# Patient Record
Sex: FEMALE | Race: WHITE | Employment: OTHER | URBAN - METROPOLITAN AREA
[De-identification: names, ages, dates, MRNs, and addresses within clinical notes are randomized per-mention and may not be internally consistent; named-entity substitution may affect disease eponyms.]

---

## 2017-09-08 ENCOUNTER — OFFICE VISIT (OUTPATIENT)
Dept: ENDOCRINOLOGY | Age: 67
End: 2017-09-08

## 2017-09-08 VITALS
WEIGHT: 158.7 LBS | DIASTOLIC BLOOD PRESSURE: 55 MMHG | SYSTOLIC BLOOD PRESSURE: 113 MMHG | HEIGHT: 66 IN | BODY MASS INDEX: 25.51 KG/M2 | HEART RATE: 56 BPM

## 2017-09-08 DIAGNOSIS — E03.9 HYPOTHYROIDISM, UNSPECIFIED TYPE: Primary | ICD-10-CM

## 2017-09-08 DIAGNOSIS — Z86.39 HISTORY OF GRAVES' DISEASE: ICD-10-CM

## 2017-09-08 RX ORDER — LEVOTHYROXINE SODIUM 75 UG/1
75 TABLET ORAL
Qty: 90 TAB | Refills: 2 | Status: SHIPPED | OUTPATIENT
Start: 2017-09-08 | End: 2021-11-02 | Stop reason: ALTCHOICE

## 2017-09-08 RX ORDER — ASCORBIC ACID 500 MG
TABLET ORAL DAILY
COMMUNITY

## 2017-09-08 RX ORDER — LANOLIN ALCOHOL/MO/W.PET/CERES
1 CREAM (GRAM) TOPICAL DAILY
COMMUNITY

## 2017-09-08 RX ORDER — ERGOCALCIFEROL (VITAMIN D2) 10 MCG
1 TABLET ORAL DAILY
COMMUNITY

## 2017-09-08 RX ORDER — ROSUVASTATIN CALCIUM 10 MG/1
10 TABLET, COATED ORAL
COMMUNITY
End: 2017-12-05

## 2017-09-08 NOTE — MR AVS SNAPSHOT
Visit Information Date & Time Provider Department Dept. Phone Encounter #  
 9/8/2017 11:10 AM Jose Bhagat, [address] [place] [address] Diabetes and Endocrinology [phone] [id number] Follow-up Instructions Return in about 2 months (around 11/8/2017). Upcoming Health Maintenance Date Due Hepatitis C Screening 1950 DTaP/Tdap/Td series (1 - Tdap) 11/5/1969 BREAST CANCER SCRN MAMMOGRAM 11/5/1998 FOBT Q 1 YEAR AGE 50-75 11/5/1998 ZOSTER VACCINE AGE 60> 9/5/2008 GLAUCOMA SCREENING Q2Y 11/5/2013 OSTEOPOROSIS SCREENING (DEXA) 11/5/2013 Pneumococcal 65+ Low/Medium Risk (1 of 2 - PCV13) 11/5/2013 MEDICARE YEARLY EXAM 11/5/2013 INFLUENZA AGE 9 TO ADULT 8/1/2017 Allergies as of 9/8/2017  Review Complete On: 9/8/2017 By: Jose Bhagat MD  
  
 Severity Noted Reaction Type Reactions Toradol [Ketorolac Tromethamine] High 08/23/2016   Systemic Other (comments) SOB and CP Percocet [Oxycodone-acetaminophen]  08/23/2016    Nausea Only Tetracyclines  08/23/2016    Nausea Only Current Immunizations  Never Reviewed No immunizations on file. Not reviewed this visit You Were Diagnosed With   
  
 Codes Comments Hypothyroidism, unspecified type    -  Primary ICD-10-CM: E03.9 ICD-9-CM: 244.9 History of Graves' disease     ICD-10-CM: Z86.39 
ICD-9-CM: V12.29 Vitals BP Pulse Height(growth percentile) Weight(growth percentile) BMI OB Status 113/55 (BP 1 Location: Left arm, BP Patient Position: Sitting) (!) 56 5' 6\" (1.676 m) 158 lb 11.2 oz (72 kg) 25.61 kg/m2 Menopause Smoking Status Never Smoker Vitals History BMI and BSA Data Body Mass Index Body Surface Area  
 25.61 kg/m 2 1.83 m 2 Preferred Pharmacy Pharmacy Name Phone [id number] [phone] Your Updated Medication List  
  
   
 This list is accurate as of: 9/8/17 11:37 AM.  Always use your most recent med list.  
  
  
  
  
 ALPRAZolam 0.5 mg tablet Commonly known as:  Celia Agustin Take 0.5 mg by mouth nightly as needed for Anxiety. Indications: ANXIETY AMBIEN 5 mg tablet Generic drug:  zolpidem Take 10 mg by mouth nightly as needed for Sleep. BIOTIN PO Take  by mouth. CYMBALTA 20 mg capsule Generic drug:  DULoxetine Take 40 mg by mouth daily. Indications: ANXIETY WITH DEPRESSION  
  
 ergocalciferol (vitamin d2) 400 unit Tab Take 1 Tab by mouth daily. glucosamine-chondroitin 500-400 mg Cap Commonly known as:  [facility] Take 1 Cap by mouth daily. levothyroxine 75 mcg tablet Commonly known as:  LEVOXYL Take 1 Tab by mouth Daily (before breakfast). Indications: hypothyroidism  
  
 rosuvastatin 10 mg tablet Commonly known as:  CRESTOR Take 10 mg by mouth nightly. therapeutic multivitamin tablet Commonly known as:  [facility] Take 1 Tab by mouth daily. Indications: VITAMIN DEFICIENCY PREVENTION  
  
 VITAMIN C 500 mg tablet Generic drug:  ascorbic acid (vitamin C) Take  by mouth. Prescriptions Sent to Pharmacy Refills  
 levothyroxine (SYNTHROID) 75 mcg tablet 2 Sig: Take 1 Tab by mouth Daily (before breakfast). Indications: hypothyroidism Class: Normal  
 Pharmacy: [id number] [address] [place] [address] #: [phone] Route: Oral  
  
We Performed the Following T4, FREE [id number] CPT(R)] THYROID PEROXIDASE (TPO) AB [53709 CPT(R)] TSH 3RD GENERATION [48152 CPT(R)] Follow-up Instructions Return in about 2 months (around 11/8/2017). To-Do List   
 10/30/2017 Lab:  T4, FREE   
  
 10/30/2017 Lab:  TSH 3RD GENERATION Patient Instructions PLEASE READ THESE INSTRUCTIONS:  
 
1. Plan to take 75 mcg of levothyroxine each morning. 2. Remember to take levothyroxine with a glass of water on an empty stomach each day in the mornings, 1 hour prior to ingesting any food or other medications, including vitamins. 3. We will plan for you to return to clinic in 2 months for re-evaluation, 
 
4. I have provided you with a lab order sheet so you can have your labs repeated 2 days before your next visit. This way we can have the results available to us in time for your visit so we can make the best decisions at that time. Introducing \[facility] [place] [facility]\"" & [facility] SERVICES! Marian Saavedra introduces [employer] patient portal. Now you can access parts of your medical record, email your doctor's office, and request medication refills online. 1. In your internet browser, go to [email or web address] 2. Click on the First Time User? Click Here link in the Sign In box. You will see the New Member Sign Up page. 3. Enter your [employer] Access Code exactly as it appears below. You will not need to use this code after youve completed the sign-up process. If you do not sign up before the expiration date, you must request a new code. · [employer] Access Code: [email or web address] Expires: 12/7/2017 11:34 AM 
 
4. Enter the last four digits of your Social Security Number (xxxx) and Date of Birth (mm/dd/yyyy) as indicated and click Submit. You will be taken to the next sign-up page. 5. Create a [employer] ID. This will be your [employer] login ID and cannot be changed, so think of one that is secure and easy to remember. 6. Create a [employer] password. You can change your password at any time. 7. Enter your Password Reset Question and Answer. This can be used at a later time if you forget your password. 8. Enter your e-mail address. You will receive e-mail notification when new information is available in [address] 9. Click Sign Up. You can now view and download portions of your medical record. 10. Click the Download Summary menu link to download a portable copy of your medical information. If you have questions, please visit the Frequently Asked Questions section of the [employer] website. Remember, [employer] is NOT to be used for urgent needs. For medical emergencies, dial 911. Now available from your iPhone and Android! Please provide this summary of care documentation to your next provider. Your primary care clinician is listed as [address] [place] [address]. If you have any questions after today's visit, please call [phone].

## 2017-09-08 NOTE — PROGRESS NOTES
CONSULTATION REQUESTED BY: Bryan Aviles MD     REASON FOR CONSULT: Hypothyroidism    CHIEF COMPLAINT: Post MARIA hypothyroidism, history of graves disease. HISTORY OF PRESENT ILLNESS:   Geraldine Mei is a 74 y.o. female with a PMHx as noted below who was referred to our endocrinology clinic for evaluation of hypothyroidism. Thyroid History:  Diagnosed with graves disease 25 years ago and had MARIA ablation,   Has been on thyroid hormone replacement since. Patient denies history of radiation (other than MARIA) exposure or trauma/surgery  Family history is significant for thryoid disease in her mother, (uncertain)  Currently taking levothyroxine 100 mcg daily,   Review of outside records indicates dose was decreased to 75 mcg in march due to high thyroid levels as below. She admits to taking it in the evenings after dinner with coffee. Patient admits to palpitations, weight gain, fatigue, and hair problems. Review of most recent thyroid function:  Outside labs dated 3/23/17:   TSH 0.118  FT4 1.96  Lab report has handwriting on it indicating need to decrease to 75 mcg. Overall, the patient would like evaluation to optimize her thyroid condition. PAST MEDICAL/SURGICAL HISTORY:   Past Medical History:   Diagnosis Date    GERD (gastroesophageal reflux disease)     not diagnosed, but pt takes lots of rolaids and tums     Thyroid disease     hypothyroidism     Past Surgical History:   Procedure Laterality Date    HX APPENDECTOMY      HX CHOLECYSTECTOMY      HX GYN      vaginal delivery x2    HX ORTHOPAEDIC Right     ORIF c hardware       ALLERGIES:   Allergies   Allergen Reactions    Toradol [Ketorolac Tromethamine] Other (comments)     SOB and CP    Percocet [Oxycodone-Acetaminophen] Nausea Only    Tetracyclines Nausea Only       MEDICATIONS ON ADMISSION:     Current Outpatient Prescriptions:     ergocalciferol, vitamin d2, 400 unit tab, Take 1 Tab by mouth daily. , Disp: , Rfl:    rosuvastatin (CRESTOR) 10 mg tablet, Take 10 mg by mouth nightly., Disp: , Rfl:     glucosamine-chondroitin (ARTHX) 500-400 mg cap, Take 1 Cap by mouth daily. , Disp: , Rfl:     ascorbic acid, vitamin C, (VITAMIN C) 500 mg tablet, Take  by mouth., Disp: , Rfl:     BIOTIN PO, Take  by mouth., Disp: , Rfl:     DULoxetine (CYMBALTA) 20 mg capsule, Take 40 mg by mouth daily. Indications: ANXIETY WITH DEPRESSION, Disp: , Rfl:     levothyroxine (LEVOXYL) 100 mcg tablet, Take 100 mcg by mouth Daily (before breakfast). Indications: HYPOTHYROIDISM, Disp: , Rfl:     ALPRAZolam (XANAX) 0.5 mg tablet, Take 0.5 mg by mouth nightly as needed for Anxiety. Indications: ANXIETY, Disp: , Rfl:     zolpidem (AMBIEN) 5 mg tablet, Take 10 mg by mouth nightly as needed for Sleep., Disp: , Rfl:     therapeutic multivitamin (THERAGRAN) tablet, Take 1 Tab by mouth daily. Indications: VITAMIN DEFICIENCY PREVENTION, Disp: , Rfl:     SOCIAL HISTORY:   Social History     Social History    Marital status:      Spouse name: N/A    Number of children: N/A    Years of education: N/A     Occupational History    Not on file.      Social History Main Topics    Smoking status: Never Smoker    Smokeless tobacco: Not on file    Alcohol use No    Drug use: Not on file    Sexual activity: Not on file     Other Topics Concern    Not on file     Social History Narrative       FAMILY HISTORY:  Family History   Problem Relation Age of Onset    Arthritis-osteo Mother     Elevated Lipids Mother    Luna May Bladder Disease Mother     Hypertension Mother     Stroke Mother     Alcohol abuse Father     Arthritis-osteo Father     Hypertension Father     Hypertension Brother     Asthma Neg Hx     Bleeding Prob Neg Hx     Cancer Neg Hx     Diabetes Neg Hx     Headache Neg Hx     Heart Disease Neg Hx     Lung Disease Neg Hx     Mental Retardation Neg Hx     Migraines Neg Hx     Psychiatric Disorder Neg Hx        REVIEW OF SYSTEMS: Complete ROS assessed and noted for that which is described above, all else are negative. Eyes: normal  ENT: normal  CVS: normal  Resp: normal  GI: normal  : normal  GYN: normal  Endocrine: normal  Integument: normal  Musculoskeletal: normal  Neuro: normal  Psych: normal      PHYSICAL EXAMINATION:    VITAL SIGNS:  Visit Vitals    /55 (BP 1 Location: Left arm, BP Patient Position: Sitting)    Pulse (!) 56    Ht 5' 6\" (1.676 m)    Wt 158 lb 11.2 oz (72 kg)    BMI 25.61 kg/m2       GENERAL: NCAT, Sitting comfortably, NAD  EYES: EOMI, non-icteric, no proptosis  Ear/Nose/Throat: NCAT, no inflammation, no masses, thyroid gland is not appreciably enlarged  LYMPH NODES: No LAD  CARDIOVASCULAR: S1 S2, RRR, No murmur, 2+ radial pulses  RESPIRATORY: CTA b/l, no wheeze/rales  GASTROINTESTINAL: NT, ND  MUSCULOSKELETAL: Normal ROM, no atrophy  SKIN: warm, no edema/rash/ or other skin changes  NEUROLOGIC: 5/5 power all extremities, no tremor, AAOx3  PSYCHIATRIC: Normal affect, Normal insight and judgement       REVIEW OF LABORATORY AND RADIOLOGY DATA:   Labs and documentation have been reviewed as described above. ASSESSMENT AND PLAN:   Trenton Garcia is a 74 y.o. female with a PMHx as noted above who was referred to our endocrinology clinic for evaluation of hypothyroidism. Hypothyroidism  History of graves disease    Patients thyroid function has fluctuated over the years due to taking levothyroxine in the evenings. She reports she was never advised of the proper way to take it. We spent time discussing the best way to have consistent levels based on proper administration. Because she is having hyperthyroid symptoms, and never decreased her dose as recommended on prior outside lab slip, likely she still has hyperthyroidism (iatrogenic) and not related to the graves disease. Likely some of her symptoms are also related to the variable absorption of her thyroid hormone.      Today we will check a TSH & FT4. Decrease to 75mcg of levothyroxine daily. Patient advised to take levothyroxine with a glass of water on an empty stomach each day in the mornings, 1 hour prior to ingesting any food or other medications, including vitamins. Discussed that if she misses a dose one day, to take two the following day, then return to once daily therafter    Plan to RTC in 2 months with calude Hdez.  [facility]

## 2017-09-08 NOTE — PATIENT INSTRUCTIONS
PLEASE READ THESE INSTRUCTIONS:     1. Plan to take 75 mcg of levothyroxine each morning. 2. Remember to take levothyroxine with a glass of water on an empty stomach each day in the mornings, 1 hour prior to ingesting any food or other medications, including vitamins. 3. We will plan for you to return to clinic in 2 months for re-evaluation,    4. I have provided you with a lab order sheet so you can have your labs repeated 2 days before your next visit. This way we can have the results available to us in time for your visit so we can make the best decisions at that time.

## 2017-09-09 LAB
T4 FREE SERPL-MCNC: 1.93 NG/DL (ref 0.82–1.77)
THYROPEROXIDASE AB SERPL-ACNC: 21 IU/ML (ref 0–34)
TSH SERPL DL<=0.005 MIU/L-ACNC: 0.05 UIU/ML (ref 0.45–4.5)

## 2017-09-11 NOTE — PROGRESS NOTES
FT4 high at 1.93 as suspected  TSH low at 0.046 also as suspected  TPO negative  We reduced levothyroxine to 75 mcg daily as of last visit,    Will reassess with prelabs before next visit,  I called patient and advised her of result and plan,    Quin Camarillo.  [address] [employer] [facility]  [facility] [place]

## 2017-12-05 NOTE — PERIOP NOTES
[facility]  Ambulatory Surgery Unit  Pre-operative Instructions    Procedure Date  12/6/17            Tentative Arrival Time 0630      1. On the day of your procedure, please report to the Ambulatory Surgery Unit Registration Desk and sign in at your designated time. The Ambulatory Surgery Unit is located in [facility] on the [facility] side of the [facility] across from the [address] [place] [address]. Please have all of your health insurance cards and a photo ID. 2. You must have someone with you to drive you home as directed by your surgeon. 3. You may have a light breakfast and take normal morning medications. 4. We recommend you do not drink any alcoholic beverages for 24 hours before and after your procedure. 5. Contact your surgeons office for instructions on the following medications: non-steroidal anti-inflammatory drugs (i.e. Advil, Aleve), vitamins, and supplements. (Some surgeons will want you to stop these medications prior to surgery and others may allow you to take them)   **If you are currently taking Plavix, Coumadin, Aspirin and/or other blood-thinning agents, contact your surgeon for instructions. ** Your surgeon will partner with the physician prescribing these medications to determine if it is safe to stop or if you need to continue taking. Please do not stop taking these medications without instructions from your surgeon. 6. In an effort to help prevent surgical site infection, we ask that you shower with an anti-bacterial soap (i.e. Dial or Safeguard) on the morning of your procedure. Do not apply any lotions, powders, or deodorants after showering. 7. Wear comfortable clothes. Wear glasses instead of contacts. Do not bring any jewelry or money (other than copays or fees as instructed). Do not wear make-up, particularly mascara, the morning of your procedure. Wear your hair loose or down, no ponytails, buns, steve pins or clips.  All body piercings must be removed. 8. You should understand that if you do not follow these instructions your procedure may be cancelled. If your physical condition changes (i.e. fever, cold or flu) please contact your surgeon as soon as possible. 9. It is important that you be on time. If a situation occurs where you may be late, or if you have any questions or problems, please call [phone].    10. Your procedure time may be subject to change. You will receive a phone call the day prior to confirm your arrival time. I understand a pre-operative phone call will be made to verify my procedure time. In the event that I am not available, I give permission for a message to be left on my answering service and/or with another person?       Yes     (instructions given verbally during phone assessment- pt voiced understanding)     ___________________      ___________________      ___________________  (Signature of Patient)          (Witness)                   (Date and Time)

## 2017-12-06 ENCOUNTER — HOSPITAL ENCOUNTER (OUTPATIENT)
Age: 67
Setting detail: OUTPATIENT SURGERY
Discharge: HOME | End: 2017-12-06
Attending: OPHTHALMOLOGY | Admitting: OPHTHALMOLOGY
Payer: MEDICARE

## 2017-12-06 VITALS
HEART RATE: 68 BPM | WEIGHT: 161 LBS | RESPIRATION RATE: 18 BRPM | SYSTOLIC BLOOD PRESSURE: 110 MMHG | TEMPERATURE: 97.7 F | DIASTOLIC BLOOD PRESSURE: 66 MMHG | OXYGEN SATURATION: 98 % | BODY MASS INDEX: 25.88 KG/M2 | HEIGHT: 66 IN

## 2017-12-06 PROBLEM — H26.492 POSTERIOR CAPSULAR OPACIFICATION VISUALLY SIGNIFICANT OF LEFT EYE: Status: ACTIVE | Noted: 2017-12-06

## 2017-12-06 PROCEDURE — 76030000017 HC AMB SURG FIRST 0.5 HR INTENSV-TIER 1: Performed by: OPHTHALMOLOGY

## 2017-12-06 PROCEDURE — 74011000250 HC RX REV CODE- 250: Performed by: OPHTHALMOLOGY

## 2017-12-06 PROCEDURE — 74011000250 HC RX REV CODE- 250

## 2017-12-06 RX ORDER — TROPICAMIDE 10 MG/ML
1 SOLUTION/ DROPS OPHTHALMIC
Status: DISCONTINUED | OUTPATIENT
Start: 2017-12-06 | End: 2017-12-06 | Stop reason: HOSPADM

## 2017-12-06 RX ORDER — PHENYLEPHRINE HYDROCHLORIDE 25 MG/ML
1 SOLUTION/ DROPS OPHTHALMIC
Status: DISCONTINUED | OUTPATIENT
Start: 2017-12-06 | End: 2017-12-06 | Stop reason: HOSPADM

## 2017-12-06 RX ORDER — TROPICAMIDE 10 MG/ML
SOLUTION/ DROPS OPHTHALMIC
Status: COMPLETED
Start: 2017-12-06 | End: 2017-12-06

## 2017-12-06 RX ORDER — PROPARACAINE HYDROCHLORIDE 5 MG/ML
1 SOLUTION/ DROPS OPHTHALMIC
Status: DISCONTINUED | OUTPATIENT
Start: 2017-12-06 | End: 2017-12-06 | Stop reason: HOSPADM

## 2017-12-06 RX ADMIN — BALANCED SALT SOLUTION 20 DROP: 6.4; .75; .48; .3; 3.9; 1.7 SOLUTION OPHTHALMIC at 07:51

## 2017-12-06 RX ADMIN — FLUOROMETHOLONE 1 DROP: 2.5 SUSPENSION/ DROPS OPHTHALMIC at 07:51

## 2017-12-06 RX ADMIN — PROPARACAINE HYDROCHLORIDE 1 DROP: 5 SOLUTION/ DROPS OPHTHALMIC at 07:23

## 2017-12-06 RX ADMIN — TROPICAMIDE 1 DROP: 10 SOLUTION/ DROPS OPHTHALMIC at 07:23

## 2017-12-06 RX ADMIN — PROPARACAINE HYDROCHLORIDE 1 DROP: 5 SOLUTION/ DROPS OPHTHALMIC at 07:31

## 2017-12-06 RX ADMIN — PROPARACAINE HYDROCHLORIDE 1 DROP: 5 SOLUTION/ DROPS OPHTHALMIC at 07:26

## 2017-12-06 RX ADMIN — HYPROMELLOSE 2910 (4000 MPA.S) 1 DROP: 25 SOLUTION/ DROPS OPHTHALMIC at 07:49

## 2017-12-06 RX ADMIN — TROPICAMIDE 1 DROP: 10 SOLUTION/ DROPS OPHTHALMIC at 07:26

## 2017-12-06 RX ADMIN — PHENYLEPHRINE HYDROCHLORIDE 1 DROP: 25 SOLUTION/ DROPS OPHTHALMIC at 07:30

## 2017-12-06 RX ADMIN — PHENYLEPHRINE HYDROCHLORIDE 1 DROP: 25 SOLUTION/ DROPS OPHTHALMIC at 07:25

## 2017-12-06 RX ADMIN — TROPICAMIDE 1 DROP: 10 SOLUTION/ DROPS OPHTHALMIC at 07:31

## 2017-12-06 RX ADMIN — PHENYLEPHRINE HYDROCHLORIDE 1 DROP: 25 SOLUTION/ DROPS OPHTHALMIC at 07:23

## 2017-12-06 NOTE — OP NOTES
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 ANESTHESIA: Topical.    LASER PARAMETERS:  Power:  2.1 millijoules per shot. Total shots delivered:  18    Estimated blood loss:None  Complications:None  Specimen removed: None    DESCRIPTION OF PROCEDURE: The patient was brought to the holding area where she received several instillations of Mydriacyl 1%, Zeferino-Synephrine 2.5%, and tetracaine until adequate pupillary dilatation was achieved. The patient's vital signs were recorded. The patient was then brought to the laser suite and received 1 drop of tetracaine preoperatively. The patient was then seated at the laser and the Schuyler capsulotomy lens was placed on the eye. The patient's posterior capsular opacification was then cleared utilizing the laser. Following this the eye was rinsed with BSS solution to remove the Goniosol solution from the surface of the eye, and the patient received 1 drop of fluorometholone drops in the operated eye. Iopidine was used at the discretion of the surgeon depending on the amount of energy necessary to clear the opacified capsule. Instructions were given to the patient verbally and written to use her FML drops 3 times a day at 9 a.m., 3 p.m., and 9 p.m. for the next 3 days. The patient will be following up with us postoperatively in the office.     [address] [place] [address]  12/6/2017

## 2018-11-02 ENCOUNTER — OFFICE VISIT (OUTPATIENT)
Dept: ENDOCRINOLOGY | Age: 68
End: 2018-11-02

## 2018-11-02 VITALS
HEIGHT: 66 IN | DIASTOLIC BLOOD PRESSURE: 66 MMHG | SYSTOLIC BLOOD PRESSURE: 101 MMHG | WEIGHT: 162.2 LBS | BODY MASS INDEX: 26.07 KG/M2 | HEART RATE: 75 BPM

## 2018-11-02 DIAGNOSIS — Z86.39 HISTORY OF GRAVES' DISEASE: ICD-10-CM

## 2018-11-02 DIAGNOSIS — E03.9 HYPOTHYROIDISM, UNSPECIFIED TYPE: Primary | ICD-10-CM

## 2018-11-02 NOTE — PATIENT INSTRUCTIONS
Continue 75 mcg levothyroxine each morning,     Remember to take levothyroxine with a glass of water only, on an empty stomach each morning, 1 hour prior to ingesting any other medications, including vitamins, food, coffee, tea, juice or any other beverages. All of these can reduce the absorption of thyroid hormone tablets and result in fluctuating levels in the blood and on labs, affecting also how one feels. Labs today, will call with plan. Plan for a 4 month follow up visit,     Remember to complete blood work at the labs 3 days before next visit,     Call with concerns,       Hortensia Kothari.  [address] [facility] Endocr[facility]  [employer]

## 2018-11-02 NOTE — PROGRESS NOTES
CHIEF COMPLAINT: f/u evaluation for hypothyroidism. HISTORY OF PRESENT ILLNESS:   Giuseppe Robison is a 69 y.o. female with a PMHx as noted below who presents to the endocrinology clinic for f/u evaluation of hypothyroidism. Diagnosed with graves disease 25 years ago and had MARIA ablation,   Has been on thyroid hormone replacement since. On her initial visit previously we reduced her levothyroxine to 75 mcg once daily,   She has continued to take this dose since then,   She is still taking her tablets in the evenings however,   She denies symptoms of hyperthyroidism,  Notes however hairloss and dry skin, along with fatigue,  There are no recent labs for review,   Review of most recent thyroid function:  Lab Results   Component Value Date    TSH 0.046 (L) 09/08/2017    FT4 1.93 (H) 09/08/2017    FT4 1.3 06/09/2009    TMCLT 21 09/08/2017      TSILT = Thyroid stimulating antibodies  TMCLT = TPO antibodies  T3LT = Total T3 levels    PAST MEDICAL/SURGICAL HISTORY:   Past Medical History:   Diagnosis Date    GERD (gastroesophageal reflux disease)     not diagnosed, but pt takes lots of rolaids and tums     Thyroid disease     hypothyroidism     Past Surgical History:   Procedure Laterality Date    HX APPENDECTOMY      HX CATARACT REMOVAL Bilateral     HX CHOLECYSTECTOMY      HX GYN      vaginal delivery x2    HX ORTHOPAEDIC Right     ORIF arm     HX ORTHOPAEDIC Right     knee scope       ALLERGIES:   Allergies   Allergen Reactions    Toradol [Ketorolac Tromethamine] Other (comments)     SOB and CP    Percocet [Oxycodone-Acetaminophen] Nausea Only    Tetracyclines Nausea Only       MEDICATIONS ON ADMISSION:     Current Outpatient Medications:     ergocalciferol, vitamin d2, 400 unit tab, Take 1 Tab by mouth daily. , Disp: , Rfl:     levothyroxine (SYNTHROID) 75 mcg tablet, Take 1 Tab by mouth Daily (before breakfast).  Indications: hypothyroidism, Disp: 90 Tab, Rfl: 2    DULoxetine (CYMBALTA) 20 mg capsule, Take 40 mg by mouth daily. Indications: ANXIETY WITH DEPRESSION, Disp: , Rfl:     ALPRAZolam (XANAX) 0.5 mg tablet, Take 0.5 mg by mouth nightly as needed for Anxiety. Indications: ANXIETY, Disp: , Rfl:     zolpidem (AMBIEN) 5 mg tablet, Take 5 mg by mouth nightly as needed for Sleep., Disp: , Rfl:     glucosamine-chondroitin (ARTHX) 500-400 mg cap, Take 1 Cap by mouth daily. , Disp: , Rfl:     ascorbic acid, vitamin C, (VITAMIN C) 500 mg tablet, Take  by mouth daily. , Disp: , Rfl:     BIOTIN PO, Take  by mouth daily. , Disp: , Rfl:     therapeutic multivitamin (THERAGRAN) tablet, Take 1 Tab by mouth daily.  Indications: VITAMIN DEFICIENCY PREVENTION, Disp: , Rfl:     SOCIAL HISTORY:   Social History     Socioeconomic History    Marital status:      Spouse name: Not on file    Number of children: Not on file    Years of education: Not on file    Highest education level: Not on file   Social Needs    Financial resource strain: Not on file    Food insecurity - worry: Not on file    Food insecurity - inability: Not on file   [employer] needs - medical: Not on file   [employer] needs - non-medical: Not on file   Occupational History    Not on file   Tobacco Use    Smoking status: Never Smoker    Smokeless tobacco: Never Used   Substance and Sexual Activity    Alcohol use: No    Drug use: No    Sexual activity: Not on file   Other Topics Concern    Not on file   Social History Narrative    Not on file       FAMILY HISTORY:  Family History   Problem Relation Age of Onset    Arthritis-osteo Mother     Elevated Lipids Mother    Mcmahon Harder Bladder Disease Mother     Hypertension Mother     Stroke Mother     Alcohol abuse Father     Arthritis-osteo Father     Hypertension Father     Hypertension Brother     Asthma Neg Hx     Bleeding Prob Neg Hx     Cancer Neg Hx     Diabetes Neg Hx     Headache Neg Hx     Heart Disease Neg Hx     Lung Disease Neg Hx     Mental Retardation Neg Hx     Migraines Neg Hx     Psychiatric Disorder Neg Hx        REVIEW OF SYSTEMS: Complete ROS assessed and noted for that which is described above, all else are negative. Eyes: normal  ENT: normal  CVS: normal  Resp: normal  GI: normal  : normal  GYN: normal  Endocrine: normal  Integument: normal  Musculoskeletal: normal  Neuro: normal  Psych: normal      PHYSICAL EXAMINATION:    VITAL SIGNS:  Visit Vitals  /66 (BP 1 Location: Left arm, BP Patient Position: Sitting)   Pulse 75   Ht 5' 6\" (1.676 m)   Wt 162 lb 3.2 oz (73.6 kg)   BMI 26.18 kg/m²       GENERAL: NCAT, Sitting comfortably, NAD  EYES: EOMI, non-icteric, no proptosis  Ear/Nose/Throat: NCAT, no inflammation, no masses  LYMPH NODES: No LAD  CARDIOVASCULAR: S1 S2, RRR, No murmur, 2+ radial pulses  RESPIRATORY: CTA b/l, no wheeze/rales  GASTROINTESTINAL:  ND  MUSCULOSKELETAL: Normal ROM, no atrophy  SKIN: warm, no edema/rash/ or other skin changes  NEUROLOGIC: 5/5 power all extremities, no tremors, AAOx3  PSYCHIATRIC: Normal affect, Normal insight and judgement      REVIEW OF LABORATORY AND RADIOLOGY DATA:   Labs and documentation have been reviewed as described above. ASSESSMENT AND PLAN:   Bar Mccracken is a 69 y.o. female with a PMHx as noted above who presents to the endocrinology clinic for the f/u evaluation of hypothyroidism. Hypothyroidism    Patient with symptoms of hypothyroidism, notably still taking her tabs in the evenings. We spent time reviewing again the best way to take levothyroxine. Today's labs may reflect this. Continue 75 mcg levothyroxine each morning. Labs today, will adjust if TSH low, if TSH elevated, will adjust depending on the degree since may improve with taking correctly,  Reviewed again the best way to take thyroid hormone each morning. Prelabs before next visit in months. RTC in 4 months with prelabs, ordered      Joen Homans.  [address] [facility]

## 2018-11-03 LAB
T4 FREE SERPL-MCNC: 1.34 NG/DL (ref 0.82–1.77)
TSH SERPL DL<=0.005 MIU/L-ACNC: 8.17 UIU/ML (ref 0.45–4.5)

## 2018-11-06 NOTE — PROGRESS NOTES
TSH is elevated, meaning thyroid hormone on low side due to poor absorption, likely due to her taking it in the evenings as noted on recent visit. Patient to start taking correctly in the mornings (Remember to take levothyroxine with a glass of water only, on an empty stomach each morning, 1 hour prior to ingesting any other medications, including vitamins, food, coffee, tea, juice or any other beverages. All of these can reduce the absorption of thyroid hormone tablets and result in fluctuating levels in the blood and on labs, affecting also how one feels. )

## 2018-11-09 ENCOUNTER — TELEPHONE (OUTPATIENT)
Dept: ENDOCRINOLOGY | Age: 68
End: 2018-11-09

## 2018-11-09 NOTE — TELEPHONE ENCOUNTER
I called Ms. Corina Estrada and relayed the message from Dr. Delia Mcduffie. She seemed to understand the information but she did tell me that she took the Levothyroxine this morning with some tylenol and a antibiotic. \"that's ok right, I mean it's not food or anything\". I reiterated that she needed to take it with just a glass of water and wait for 1 hour until she puts Anything else in her mouth. She said she would make sure to do this going forward.   Sharri Abad

## 2018-11-09 NOTE — TELEPHONE ENCOUNTER
----- Message from Zina Nunes MD sent at 11/5/2018  8:48 PM EST -----  TSH is elevated, meaning thyroid hormone on low side due to poor absorption, likely due to her taking it in the evenings as noted on recent visit. Patient to start taking correctly in the mornings (Remember to take levothyroxine with a glass of water only, on an empty stomach each morning, 1 hour prior to ingesting any other medications, including vitamins, food, coffee, tea, juice or any other beverages.  All of these can reduce the absorption of thyroid hormone tablets and result in fluctuating levels in the blood and on labs, affecting also how one feels.)

## 2021-08-12 ENCOUNTER — TRANSCRIBE ORDER (OUTPATIENT)
Dept: SCHEDULING | Age: 71
End: 2021-08-12

## 2021-08-12 DIAGNOSIS — S32.010A COMPRESSION FRACTURE OF L1 LUMBAR VERTEBRA (HCC): Primary | ICD-10-CM

## 2021-08-13 ENCOUNTER — TRANSCRIBE ORDER (OUTPATIENT)
Dept: SCHEDULING | Age: 71
End: 2021-08-13

## 2021-08-13 ENCOUNTER — HOSPITAL ENCOUNTER (OUTPATIENT)
Dept: MRI IMAGING | Age: 71
Discharge: HOME | End: 2021-08-13
Attending: NURSE PRACTITIONER
Payer: MEDICARE

## 2021-08-13 DIAGNOSIS — S32.010A COMPRESSION FRACTURE OF L1 LUMBAR VERTEBRA (HCC): Primary | ICD-10-CM

## 2021-08-13 DIAGNOSIS — S32.010A COMPRESSION FRACTURE OF L1 LUMBAR VERTEBRA (HCC): ICD-10-CM

## 2021-08-13 PROCEDURE — 72148 MRI LUMBAR SPINE W/O DYE: CPT

## 2021-09-02 ENCOUNTER — APPOINTMENT (OUTPATIENT)
Dept: CT IMAGING | Age: 71
End: 2021-09-02
Attending: EMERGENCY MEDICINE
Payer: MEDICARE

## 2021-09-02 ENCOUNTER — HOSPITAL ENCOUNTER (EMERGENCY)
Age: 71
Discharge: HOME | End: 2021-09-02
Attending: EMERGENCY MEDICINE | Admitting: EMERGENCY MEDICINE
Payer: MEDICARE

## 2021-09-02 VITALS
SYSTOLIC BLOOD PRESSURE: 153 MMHG | RESPIRATION RATE: 14 BRPM | OXYGEN SATURATION: 99 % | TEMPERATURE: 98 F | BODY MASS INDEX: 24.91 KG/M2 | DIASTOLIC BLOOD PRESSURE: 60 MMHG | HEIGHT: 66 IN | WEIGHT: 155 LBS | HEART RATE: 86 BPM

## 2021-09-02 DIAGNOSIS — S09.90XA CLOSED HEAD INJURY, INITIAL ENCOUNTER: Primary | ICD-10-CM

## 2021-09-02 DIAGNOSIS — M48.02 CERVICAL SPINAL STENOSIS: ICD-10-CM

## 2021-09-02 DIAGNOSIS — S06.0X1A CONCUSSION WITH LOSS OF CONSCIOUSNESS OF 30 MINUTES OR LESS, INITIAL ENCOUNTER: ICD-10-CM

## 2021-09-02 LAB
ALBUMIN SERPL-MCNC: 3.8 G/DL (ref 3.5–5)
ALBUMIN/GLOB SERPL: 1.1 {RATIO} (ref 1.1–2.2)
ALP SERPL-CCNC: 77 U/L (ref 45–117)
ALT SERPL-CCNC: 26 U/L (ref 12–78)
ANION GAP SERPL CALC-SCNC: 6 MMOL/L (ref 5–15)
APPEARANCE UR: CLEAR
AST SERPL-CCNC: 17 U/L (ref 15–37)
ATRIAL RATE: 91 BPM
BACTERIA URNS QL MICRO: NEGATIVE /HPF
BASOPHILS # BLD: 0.1 K/UL (ref 0–0.1)
BASOPHILS NFR BLD: 1 % (ref 0–1)
BILIRUB SERPL-MCNC: 0.3 MG/DL (ref 0.2–1)
BILIRUB UR QL: NEGATIVE
BUN SERPL-MCNC: 16 MG/DL (ref 6–20)
BUN/CREAT SERPL: 14 (ref 12–20)
CALCIUM SERPL-MCNC: 8.6 MG/DL (ref 8.5–10.1)
CALCULATED P AXIS, ECG09: 56 DEGREES
CALCULATED R AXIS, ECG10: -9 DEGREES
CALCULATED T AXIS, ECG11: 41 DEGREES
CHLORIDE SERPL-SCNC: 106 MMOL/L (ref 97–108)
CO2 SERPL-SCNC: 26 MMOL/L (ref 21–32)
COLOR UR: NORMAL
CREAT SERPL-MCNC: 1.11 MG/DL (ref 0.55–1.02)
DIAGNOSIS, 93000: NORMAL
DIFFERENTIAL METHOD BLD: NORMAL
EOSINOPHIL # BLD: 0.2 K/UL (ref 0–0.4)
EOSINOPHIL NFR BLD: 3 % (ref 0–7)
EPITH CASTS URNS QL MICRO: NORMAL /LPF
ERYTHROCYTE [DISTWIDTH] IN BLOOD BY AUTOMATED COUNT: 12.4 % (ref 11.5–14.5)
GLOBULIN SER CALC-MCNC: 3.5 G/DL (ref 2–4)
GLUCOSE SERPL-MCNC: 92 MG/DL (ref 65–100)
GLUCOSE UR STRIP.AUTO-MCNC: NEGATIVE MG/DL
HCT VFR BLD AUTO: 39.3 % (ref 35–47)
HGB BLD-MCNC: 12.9 G/DL (ref 11.5–16)
HGB UR QL STRIP: NEGATIVE
HYALINE CASTS URNS QL MICRO: NORMAL /LPF (ref 0–5)
IMM GRANULOCYTES # BLD AUTO: 0 K/UL (ref 0–0.04)
IMM GRANULOCYTES NFR BLD AUTO: 0 % (ref 0–0.5)
KETONES UR QL STRIP.AUTO: NEGATIVE MG/DL
LEUKOCYTE ESTERASE UR QL STRIP.AUTO: NEGATIVE
LYMPHOCYTES # BLD: 1.9 K/UL (ref 0.8–3.5)
LYMPHOCYTES NFR BLD: 25 % (ref 12–49)
MAGNESIUM SERPL-MCNC: 2 MG/DL (ref 1.6–2.4)
MCH RBC QN AUTO: 31.4 PG (ref 26–34)
MCHC RBC AUTO-ENTMCNC: 32.8 G/DL (ref 30–36.5)
MCV RBC AUTO: 95.6 FL (ref 80–99)
MONOCYTES # BLD: 0.6 K/UL (ref 0–1)
MONOCYTES NFR BLD: 8 % (ref 5–13)
NEUTS SEG # BLD: 4.7 K/UL (ref 1.8–8)
NEUTS SEG NFR BLD: 63 % (ref 32–75)
NITRITE UR QL STRIP.AUTO: NEGATIVE
NRBC # BLD: 0 K/UL (ref 0–0.01)
NRBC BLD-RTO: 0 PER 100 WBC
P-R INTERVAL, ECG05: 194 MS
PH UR STRIP: 6 [PH] (ref 5–8)
PLATELET # BLD AUTO: 265 K/UL (ref 150–400)
PMV BLD AUTO: 11.2 FL (ref 8.9–12.9)
POTASSIUM SERPL-SCNC: 3.9 MMOL/L (ref 3.5–5.1)
PROT SERPL-MCNC: 7.3 G/DL (ref 6.4–8.2)
PROT UR STRIP-MCNC: NEGATIVE MG/DL
Q-T INTERVAL, ECG07: 378 MS
QRS DURATION, ECG06: 82 MS
QTC CALCULATION (BEZET), ECG08: 464 MS
RBC # BLD AUTO: 4.11 M/UL (ref 3.8–5.2)
RBC #/AREA URNS HPF: NORMAL /HPF (ref 0–5)
SODIUM SERPL-SCNC: 138 MMOL/L (ref 136–145)
SP GR UR REFRACTOMETRY: 1.01 (ref 1–1.03)
UA: UC IF INDICATED,UAUC: NORMAL
UROBILINOGEN UR QL STRIP.AUTO: 0.2 EU/DL (ref 0.2–1)
VENTRICULAR RATE, ECG03: 91 BPM
WBC # BLD AUTO: 7.4 K/UL (ref 3.6–11)
WBC URNS QL MICRO: NORMAL /HPF (ref 0–4)

## 2021-09-02 PROCEDURE — 90471 IMMUNIZATION ADMIN: CPT

## 2021-09-02 PROCEDURE — 70450 CT HEAD/BRAIN W/O DYE: CPT

## 2021-09-02 PROCEDURE — 96376 TX/PRO/DX INJ SAME DRUG ADON: CPT

## 2021-09-02 PROCEDURE — 96375 TX/PRO/DX INJ NEW DRUG ADDON: CPT

## 2021-09-02 PROCEDURE — 80053 COMPREHEN METABOLIC PANEL: CPT

## 2021-09-02 PROCEDURE — 72125 CT NECK SPINE W/O DYE: CPT

## 2021-09-02 PROCEDURE — 74011250637 HC RX REV CODE- 250/637: Performed by: EMERGENCY MEDICINE

## 2021-09-02 PROCEDURE — 99285 EMERGENCY DEPT VISIT HI MDM: CPT

## 2021-09-02 PROCEDURE — 85025 COMPLETE CBC W/AUTO DIFF WBC: CPT

## 2021-09-02 PROCEDURE — 96374 THER/PROPH/DIAG INJ IV PUSH: CPT

## 2021-09-02 PROCEDURE — 90715 TDAP VACCINE 7 YRS/> IM: CPT | Performed by: EMERGENCY MEDICINE

## 2021-09-02 PROCEDURE — 93005 ELECTROCARDIOGRAM TRACING: CPT

## 2021-09-02 PROCEDURE — 81001 URINALYSIS AUTO W/SCOPE: CPT

## 2021-09-02 PROCEDURE — 83735 ASSAY OF MAGNESIUM: CPT

## 2021-09-02 PROCEDURE — 36415 COLL VENOUS BLD VENIPUNCTURE: CPT

## 2021-09-02 PROCEDURE — 74011250636 HC RX REV CODE- 250/636: Performed by: EMERGENCY MEDICINE

## 2021-09-02 RX ORDER — ONDANSETRON 2 MG/ML
4 INJECTION INTRAMUSCULAR; INTRAVENOUS
Status: COMPLETED | OUTPATIENT
Start: 2021-09-02 | End: 2021-09-02

## 2021-09-02 RX ORDER — METHOCARBAMOL 750 MG/1
750 TABLET, FILM COATED ORAL
Status: COMPLETED | OUTPATIENT
Start: 2021-09-02 | End: 2021-09-02

## 2021-09-02 RX ORDER — HYDROCODONE BITARTRATE AND ACETAMINOPHEN 5; 325 MG/1; MG/1
1 TABLET ORAL
Qty: 10 TABLET | Refills: 0 | Status: SHIPPED | OUTPATIENT
Start: 2021-09-02 | End: 2021-09-05

## 2021-09-02 RX ORDER — METHOCARBAMOL 750 MG/1
750 TABLET, FILM COATED ORAL
Qty: 20 TABLET | Refills: 0 | Status: SHIPPED | OUTPATIENT
Start: 2021-09-02

## 2021-09-02 RX ORDER — FENTANYL CITRATE 50 UG/ML
25 INJECTION, SOLUTION INTRAMUSCULAR; INTRAVENOUS
Status: COMPLETED | OUTPATIENT
Start: 2021-09-02 | End: 2021-09-02

## 2021-09-02 RX ORDER — ONDANSETRON 4 MG/1
4 TABLET, ORALLY DISINTEGRATING ORAL
Qty: 10 TABLET | Refills: 0 | Status: SHIPPED | OUTPATIENT
Start: 2021-09-02

## 2021-09-02 RX ADMIN — METHOCARBAMOL TABLETS 750 MG: 750 TABLET, COATED ORAL at 16:43

## 2021-09-02 RX ADMIN — FENTANYL CITRATE 25 MCG: 50 INJECTION, SOLUTION INTRAMUSCULAR; INTRAVENOUS at 16:43

## 2021-09-02 RX ADMIN — TETANUS TOXOID, REDUCED DIPHTHERIA TOXOID AND ACELLULAR PERTUSSIS VACCINE, ADSORBED 0.5 ML: 5; 2.5; 8; 8; 2.5 SUSPENSION INTRAMUSCULAR at 15:08

## 2021-09-02 RX ADMIN — FENTANYL CITRATE 25 MCG: 50 INJECTION, SOLUTION INTRAMUSCULAR; INTRAVENOUS at 15:07

## 2021-09-02 RX ADMIN — ONDANSETRON 4 MG: 2 INJECTION INTRAMUSCULAR; INTRAVENOUS at 15:07

## 2021-09-02 NOTE — ED NOTES
Pt. Presents to ED today for complaints of a fall. Patient does not remember falling. Patient was found by  when he got home with blood to the back of her head and blood on the floor. Patient does not remember events leading up to the fall or falling. Pt. With some periodic confusion upon arrival.  Bleeding to head is controlled at this time.

## 2021-09-02 NOTE — ED NOTES
Eloy Guillen RN reviewed discharge instructions with the patient. The patient verbalized understanding. All questions and concerns were addressed. The patient is discharged via wheelchair in the care of family members with instructions and prescriptions in hand. Pt is alert and oriented x 4. Respirations are clear and unlabored.
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
## 2021-09-08 ENCOUNTER — TRANSCRIBE ORDER (OUTPATIENT)
Dept: SCHEDULING | Age: 71
End: 2021-09-08

## 2021-09-08 DIAGNOSIS — R41.3 MEMORY LOSS: Primary | ICD-10-CM

## 2021-09-08 DIAGNOSIS — R51.9 HEADACHE: ICD-10-CM

## 2021-09-09 ENCOUNTER — HOSPITAL ENCOUNTER (OUTPATIENT)
Dept: MRI IMAGING | Age: 71
Discharge: HOME | End: 2021-09-09
Attending: FAMILY MEDICINE
Payer: MEDICARE

## 2021-09-09 DIAGNOSIS — R51.9 HEADACHE: ICD-10-CM

## 2021-09-09 DIAGNOSIS — R41.3 MEMORY LOSS: ICD-10-CM

## 2021-09-09 PROCEDURE — 70551 MRI BRAIN STEM W/O DYE: CPT
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
## 2021-11-02 ENCOUNTER — OFFICE VISIT (OUTPATIENT)
Dept: ENDOCRINOLOGY | Age: 71
End: 2021-11-02
Payer: MEDICARE

## 2021-11-02 VITALS
WEIGHT: 161.3 LBS | SYSTOLIC BLOOD PRESSURE: 105 MMHG | OXYGEN SATURATION: 96 % | BODY MASS INDEX: 25.92 KG/M2 | DIASTOLIC BLOOD PRESSURE: 70 MMHG | HEIGHT: 66 IN | RESPIRATION RATE: 16 BRPM | HEART RATE: 74 BPM

## 2021-11-02 DIAGNOSIS — E89.0 POSTABLATIVE HYPOTHYROIDISM: Primary | ICD-10-CM

## 2021-11-02 DIAGNOSIS — R55 SYNCOPE, UNSPECIFIED SYNCOPE TYPE: ICD-10-CM

## 2021-11-02 PROCEDURE — 99215 OFFICE O/P EST HI 40 MIN: CPT | Performed by: INTERNAL MEDICINE

## 2021-11-02 RX ORDER — NORTRIPTYLINE HYDROCHLORIDE 10 MG/1
CAPSULE ORAL
COMMUNITY
Start: 2021-10-05

## 2021-11-02 RX ORDER — LEVOTHYROXINE SODIUM 88 UG/1
TABLET ORAL
Qty: 90 TABLET | Refills: 1 | Status: SHIPPED | OUTPATIENT
Start: 2021-11-02

## 2021-11-02 RX ORDER — DICLOFENAC SODIUM 10 MG/G
GEL TOPICAL
COMMUNITY
Start: 2021-10-05

## 2021-11-02 RX ORDER — LEVOTHYROXINE SODIUM 88 UG/1
88 TABLET ORAL DAILY
COMMUNITY
Start: 2021-10-25 | End: 2021-11-02 | Stop reason: ALTCHOICE

## 2021-11-02 RX ORDER — ROSUVASTATIN CALCIUM 10 MG/1
TABLET, COATED ORAL
COMMUNITY
Start: 2021-10-25

## 2021-11-02 NOTE — LETTER
11/2/2021 Patient: Jerrica Orourke YOB: 1950 Date of Visit: 11/2/2021 Anshul Novak MD 
[phone] [id number] [address] Fax: [phone] Dear Anshul Novak MD, Thank you for referring Ms. Jerrica Orourke to [facility] for evaluation. My notes for this consultation are attached. If you have questions, please do not hesitate to call me. I look forward to following your patient along with you. Sincerely, Negra Orr MD

## 2021-12-14 DIAGNOSIS — E89.0 POSTABLATIVE HYPOTHYROIDISM: ICD-10-CM

## 2021-12-22 ENCOUNTER — HOSPITAL ENCOUNTER (OUTPATIENT)
Dept: GENERAL RADIOLOGY | Age: 71
Discharge: HOME | End: 2021-12-22
Payer: MEDICARE

## 2021-12-22 ENCOUNTER — TRANSCRIBE ORDER (OUTPATIENT)
Dept: REGISTRATION | Age: 71
End: 2021-12-22

## 2021-12-22 DIAGNOSIS — Z28.21 VACCINATION NOT CARRIED OUT BECAUSE OF PATIENT REFUSAL: ICD-10-CM

## 2021-12-22 DIAGNOSIS — Z28.21 VACCINATION NOT CARRIED OUT BECAUSE OF PATIENT REFUSAL: Primary | ICD-10-CM

## 2021-12-22 PROCEDURE — 72114 X-RAY EXAM L-S SPINE BENDING: CPT

## 2021-12-23 ENCOUNTER — TRANSCRIBE ORDER (OUTPATIENT)
Dept: SCHEDULING | Age: 71
End: 2021-12-23

## 2021-12-23 DIAGNOSIS — S32.010A COMPRESSION FRACTURE OF L1 VERTEBRA (HCC): Primary | ICD-10-CM

## 2021-12-27 ENCOUNTER — HOSPITAL ENCOUNTER (OUTPATIENT)
Dept: MRI IMAGING | Age: 71
Discharge: HOME | End: 2021-12-27
Attending: SPECIALIST
Payer: MEDICARE

## 2021-12-27 DIAGNOSIS — S32.010A COMPRESSION FRACTURE OF L1 VERTEBRA (HCC): ICD-10-CM

## 2021-12-27 PROCEDURE — 72148 MRI LUMBAR SPINE W/O DYE: CPT

## 2022-03-19 PROBLEM — H26.492 POSTERIOR CAPSULAR OPACIFICATION VISUALLY SIGNIFICANT OF LEFT EYE: Status: ACTIVE | Noted: 2017-12-06

## 2022-08-09 ENCOUNTER — TRANSCRIBE ORDER (OUTPATIENT)
Dept: SCHEDULING | Age: 72
End: 2022-08-09

## 2022-08-09 DIAGNOSIS — H53.483 BILATERAL VISUAL FIELD CONSTRICTION: Primary | ICD-10-CM

## 2022-08-09 DIAGNOSIS — F07.81 POSTCONCUSSION SYNDROME: ICD-10-CM

## 2022-08-10 ENCOUNTER — HOSPITAL ENCOUNTER (OUTPATIENT)
Dept: CT IMAGING | Age: 72
Discharge: HOME | End: 2022-08-10
Attending: OPHTHALMOLOGY
Payer: MEDICARE

## 2022-08-10 DIAGNOSIS — H53.483 BILATERAL VISUAL FIELD CONSTRICTION: ICD-10-CM

## 2022-08-10 DIAGNOSIS — F07.81 POSTCONCUSSION SYNDROME: ICD-10-CM

## 2022-08-10 PROCEDURE — 70450 CT HEAD/BRAIN W/O DYE: CPT

## 2024-07-26 ENCOUNTER — HOSPITAL ENCOUNTER (OUTPATIENT)
Facility: HOSPITAL | Age: 74
End: 2024-07-26
Payer: MEDICARE

## 2024-07-26 DIAGNOSIS — M15.0 PRIMARY GENERALIZED (OSTEO)ARTHRITIS: ICD-10-CM

## 2024-07-26 DIAGNOSIS — M13.0 POLYARTHRITIS: ICD-10-CM

## 2024-07-26 DIAGNOSIS — M25.512 LEFT SHOULDER PAIN, UNSPECIFIED CHRONICITY: ICD-10-CM

## 2024-07-26 PROCEDURE — 73030 X-RAY EXAM OF SHOULDER: CPT

## 2024-07-26 PROCEDURE — 73630 X-RAY EXAM OF FOOT: CPT

## 2024-07-26 PROCEDURE — 73562 X-RAY EXAM OF KNEE 3: CPT

## 2025-03-14 ENCOUNTER — TRANSCRIBE ORDERS (OUTPATIENT)
Facility: HOSPITAL | Age: 75
End: 2025-03-14

## 2025-03-14 DIAGNOSIS — M54.2 NECK PAIN: Primary | ICD-10-CM
